# Patient Record
Sex: FEMALE | Race: BLACK OR AFRICAN AMERICAN | NOT HISPANIC OR LATINO | Employment: UNEMPLOYED | URBAN - METROPOLITAN AREA
[De-identification: names, ages, dates, MRNs, and addresses within clinical notes are randomized per-mention and may not be internally consistent; named-entity substitution may affect disease eponyms.]

---

## 2023-02-12 ENCOUNTER — OFFICE VISIT (OUTPATIENT)
Dept: URGENT CARE | Facility: CLINIC | Age: 48
End: 2023-02-12

## 2023-02-12 VITALS
TEMPERATURE: 97.5 F | HEART RATE: 55 BPM | OXYGEN SATURATION: 98 % | SYSTOLIC BLOOD PRESSURE: 128 MMHG | RESPIRATION RATE: 16 BRPM | DIASTOLIC BLOOD PRESSURE: 91 MMHG

## 2023-02-12 DIAGNOSIS — J02.9 ACUTE PHARYNGITIS, UNSPECIFIED ETIOLOGY: ICD-10-CM

## 2023-02-12 DIAGNOSIS — J02.9 SORE THROAT: Primary | ICD-10-CM

## 2023-02-12 LAB — S PYO AG THROAT QL: NEGATIVE

## 2023-02-12 RX ORDER — LIDOCAINE HYDROCHLORIDE 20 MG/ML
10 SOLUTION OROPHARYNGEAL 4 TIMES DAILY PRN
Qty: 100 ML | Refills: 0 | Status: SHIPPED | OUTPATIENT
Start: 2023-02-12

## 2023-02-12 RX ORDER — PENICILLIN V POTASSIUM 500 MG/1
500 TABLET ORAL 4 TIMES DAILY
Qty: 40 TABLET | Refills: 0 | Status: SHIPPED | OUTPATIENT
Start: 2023-02-12 | End: 2023-02-22

## 2023-02-12 RX ORDER — NORETHINDRONE ACETATE/ETHINYL ESTRADIOL AND FERROUS FUMARATE 1MG-20(21)
1 KIT ORAL DAILY
COMMUNITY
Start: 2022-12-26

## 2023-02-12 NOTE — PROGRESS NOTES
[address] [employer]        NAME: Efrain Lawrence is a 52 y o  female  : 1975    MRN: [id number]  DATE: 2023  TIME: 9:20 AM    Assessment and Plan   Sore throat [J02 9]  1  Sore throat  POCT rapid strepA    Throat culture    Suspect strep      2  Acute pharyngitis, unspecified etiology  penicillin V potassium (VEETID) 500 mg tablet    Lidocaine Viscous HCl (XYLOCAINE) 2 % mucosal solution        Rapid strep was negative  Will send culture  Patient states daughter's rapid strep was also negative in the pediatrician's office and then it cultured out strep  Patient Instructions     Patient Instructions   Tylenol 650 mg every 4 hours for fever/pain  Motrin every 6 hours for fever/pain  Check MyChart in 24 to 72 hours for your throat culture results  Follow-up with your PCP in 2 to 3 days  If symptoms get worse proceed to the ER  Encourage liquids and rest  Hot tea/honey  Gargle with warm salt water 3 x/day for sore throat         Follow up with PCP in 3-5 days  Proceed to  ER if symptoms worsen  Chief Complaint     Chief Complaint   Patient presents with   • Sore Throat     Sore throat since yesterday  History of Present Illness       53 yo b female with chief complaint of a sore throat which started yesterday  Patient's daughter was diagnosed with strep throat and she did share a drink with her recently  Review of Systems   Review of Systems   Constitutional: Negative for chills and fever  HENT: Positive for sore throat and trouble swallowing  Negative for congestion and rhinorrhea  Eyes: Negative for discharge and visual disturbance  Respiratory: Negative for shortness of breath and wheezing  Cardiovascular: Negative for chest pain and palpitations  Gastrointestinal: Negative for abdominal pain and vomiting  Endocrine: Negative for polydipsia and polyuria  Genitourinary: Negative for dysuria and hematuria     Musculoskeletal: Negative for arthralgias, gait problem and neck stiffness  Skin: Negative for rash and wound  Neurological: Negative for dizziness and headaches  Psychiatric/Behavioral: Negative for confusion and suicidal ideas  Current Medications       Current Outpatient Medications:   •  Bashir Fe 1/20 1-20 MG-MCG per tablet, Take 1 tablet by mouth daily, Disp: , Rfl:   •  Lidocaine Viscous HCl (XYLOCAINE) 2 % mucosal solution, Swish and swallow 10 mL 4 (four) times a day as needed for mouth pain or discomfort, Disp: 100 mL, Rfl: 0  •  penicillin V potassium (VEETID) 500 mg tablet, Take 1 tablet (500 mg total) by mouth 4 (four) times a day for 10 days, Disp: 40 tablet, Rfl: 0    Current Allergies     Allergies as of 02/12/2023   • (No Known Allergies)            The following portions of the patient's history were reviewed and updated as appropriate: allergies, current medications, past family history, past medical history, past social history, past surgical history and problem list      History reviewed  No pertinent past medical history  History reviewed  No pertinent surgical history  History reviewed  No pertinent family history  Medications have been verified  Objective   /91   Pulse 55   Temp 97 5 °F (36 4 °C) (Temporal)   Resp 16   LMP 02/10/2023   SpO2 98%        Physical Exam     Physical Exam  Vitals reviewed  Constitutional:       General: She is not in acute distress  Appearance: She is well-developed  She is not ill-appearing, toxic-appearing or diaphoretic  Comments: 71-year-old black female sitting on the stretcher complaining of a sore throat   HENT:      Head: Normocephalic and atraumatic  Mouth/Throat:      Pharynx: Oropharynx is clear  Posterior oropharyngeal erythema present  Comments: There is erythema of the posterior pharynx  I do not appreciate any exudate  Eyes:      General: No scleral icterus  Extraocular Movements: Extraocular movements intact        Pupils: Pupils are equal, round, and reactive to light  Cardiovascular:      Rate and Rhythm: Normal rate and regular rhythm  Heart sounds: Normal heart sounds  Pulmonary:      Effort: Pulmonary effort is normal  No respiratory distress  Breath sounds: Normal breath sounds  No stridor  No wheezing, rhonchi or rales  Chest:      Chest wall: No tenderness  Abdominal:      General: Abdomen is flat  Bowel sounds are normal  There is no distension  Palpations: Abdomen is soft  There is no shifting dullness, hepatomegaly, splenomegaly or mass  Tenderness: There is no abdominal tenderness  There is no right CVA tenderness, left CVA tenderness or guarding  Negative signs include Love's sign and McBurney's sign  Hernia: No hernia is present  Skin:     General: Skin is warm and dry  Coloration: Skin is not cyanotic, jaundiced, mottled or pale  Findings: No erythema  Neurological:      General: No focal deficit present  Mental Status: She is alert and oriented to person, place, and time     Psychiatric:         Mood and Affect: Mood normal

## 2023-02-12 NOTE — PATIENT INSTRUCTIONS
Tylenol 650 mg every 4 hours for fever/pain  Motrin every 6 hours for fever/pain  Check MyChart in 24 to 72 hours for your throat culture results  Follow-up with your PCP in 2 to 3 days  If symptoms get worse proceed to the ER  Encourage liquids and rest  Hot tea/honey  Gargle with warm salt water 3 x/day for sore throat

## 2023-02-13 ENCOUNTER — TELEPHONE (OUTPATIENT)
Dept: OTHER | Facility: OTHER | Age: 48
End: 2023-02-13

## 2023-02-13 ENCOUNTER — NURSE TRIAGE (OUTPATIENT)
Dept: OTHER | Facility: OTHER | Age: 48
End: 2023-02-13

## 2023-02-13 NOTE — TELEPHONE ENCOUNTER
Answer Assessment - Initial Assessment Questions  1  REASON FOR CALL or QUESTION: "What is your reason for calling today?" or "How can I best help you?" or "What question do you have that I can help answer?"      Pt states she was given  PCN yesterday from urgent care feels weird now  Informed patient to call her PCP and to go back to Urgent care  Pt does not see [facility] providers currently      Protocols used: INFORMATION ONLY CALL - NO TRIAGE-ADULT-

## 2023-02-13 NOTE — TELEPHONE ENCOUNTER
Regarding: On PCN-not feeling herself   ----- Message from Fede Chairez RN sent at 2/13/2023  6:13 PM EST -----  "How do I know if I am having a reaction to the PCN that I got at [facility] yesterday?  I just do not feel like myself since I started taking it "

## 2023-02-15 LAB — BACTERIA THROAT CULT: ABNORMAL
